# Patient Record
Sex: MALE | Race: WHITE | NOT HISPANIC OR LATINO | URBAN - METROPOLITAN AREA
[De-identification: names, ages, dates, MRNs, and addresses within clinical notes are randomized per-mention and may not be internally consistent; named-entity substitution may affect disease eponyms.]

---

## 2020-10-16 ENCOUNTER — TELEPHONE (OUTPATIENT)
Dept: PULMONOLOGY | Facility: CLINIC | Age: 75
End: 2020-10-16

## 2020-10-16 NOTE — TELEPHONE ENCOUNTER
----- Message from Flory Barber MA sent at 10/16/2020  8:05 AM CDT -----    ----- Message -----  From: Ifeoma Saldaña  Sent: 10/15/2020   2:43 PM CDT  To: [facility] Staff    Referral from [facility] 10/15/20

## 2020-10-20 ENCOUNTER — OFFICE VISIT (OUTPATIENT)
Dept: PULMONOLOGY | Facility: CLINIC | Age: 75
End: 2020-10-20
Payer: MEDICARE

## 2020-10-20 VITALS
DIASTOLIC BLOOD PRESSURE: 100 MMHG | HEART RATE: 110 BPM | WEIGHT: 148 LBS | OXYGEN SATURATION: 96 % | SYSTOLIC BLOOD PRESSURE: 150 MMHG | BODY MASS INDEX: 21.92 KG/M2 | TEMPERATURE: 98 F | HEIGHT: 69 IN

## 2020-10-20 DIAGNOSIS — J44.9 CHRONIC OBSTRUCTIVE PULMONARY DISEASE, UNSPECIFIED COPD TYPE: Primary | ICD-10-CM

## 2020-10-20 DIAGNOSIS — Z87.891 PERSONAL HISTORY OF NICOTINE DEPENDENCE: ICD-10-CM

## 2020-10-20 PROCEDURE — 99204 OFFICE O/P NEW MOD 45 MIN: CPT | Mod: 25,S$GLB,, | Performed by: NURSE PRACTITIONER

## 2020-10-20 PROCEDURE — G0008 ADMIN INFLUENZA VIRUS VAC: HCPCS | Mod: S$GLB,,, | Performed by: NURSE PRACTITIONER

## 2020-10-20 PROCEDURE — G0008 FLU VACCINE - QUADRIVALENT - HIGH DOSE (65+) PRESERVATIVE FREE IM: ICD-10-PCS | Mod: S$GLB,,, | Performed by: NURSE PRACTITIONER

## 2020-10-20 PROCEDURE — 90662 FLU VACCINE - QUADRIVALENT - HIGH DOSE (65+) PRESERVATIVE FREE IM: ICD-10-PCS | Mod: S$GLB,,, | Performed by: NURSE PRACTITIONER

## 2020-10-20 PROCEDURE — 90662 IIV NO PRSV INCREASED AG IM: CPT | Mod: S$GLB,,, | Performed by: NURSE PRACTITIONER

## 2020-10-20 PROCEDURE — 99204 PR OFFICE/OUTPT VISIT, NEW, LEVL IV, 45-59 MIN: ICD-10-PCS | Mod: 25,S$GLB,, | Performed by: NURSE PRACTITIONER

## 2020-10-20 RX ORDER — METHYLPREDNISOLONE 4 MG/1
TABLET ORAL
COMMUNITY
Start: 2020-09-05

## 2020-10-20 RX ORDER — METFORMIN HYDROCHLORIDE 500 MG/1
TABLET ORAL
COMMUNITY
Start: 2020-10-16

## 2020-10-20 RX ORDER — EZETIMIBE 10 MG/1
TABLET ORAL
COMMUNITY
Start: 2020-10-05

## 2020-10-20 RX ORDER — ASPIRIN 81 MG/1
81 TABLET ORAL
COMMUNITY

## 2020-10-20 RX ORDER — AMLODIPINE BESYLATE 5 MG/1
5 TABLET ORAL
COMMUNITY

## 2020-10-20 RX ORDER — FLUTICASONE FUROATE AND VILANTEROL TRIFENATATE 200; 25 UG/1; UG/1
POWDER RESPIRATORY (INHALATION)
COMMUNITY
Start: 2020-10-07

## 2020-10-20 RX ORDER — BUSPIRONE HYDROCHLORIDE 10 MG/1
TABLET ORAL
COMMUNITY
Start: 2020-08-07

## 2020-10-20 RX ORDER — MONTELUKAST SODIUM 10 MG/1
TABLET ORAL
COMMUNITY
Start: 2020-08-09

## 2020-10-20 RX ORDER — ESOMEPRAZOLE MAGNESIUM 40 MG/1
CAPSULE, DELAYED RELEASE ORAL
COMMUNITY
Start: 2020-08-07

## 2020-10-20 RX ORDER — LORATADINE 10 MG/1
TABLET ORAL
COMMUNITY
Start: 2020-09-25

## 2020-10-20 RX ORDER — LOSARTAN POTASSIUM 100 MG/1
TABLET ORAL
COMMUNITY
Start: 2020-10-05

## 2020-10-20 RX ORDER — LEVALBUTEROL INHALATION SOLUTION 1.25 MG/3ML
SOLUTION RESPIRATORY (INHALATION)
COMMUNITY
Start: 2020-09-25

## 2020-10-20 RX ORDER — GEMFIBROZIL 600 MG/1
TABLET, FILM COATED ORAL
COMMUNITY
Start: 2020-08-07

## 2020-10-20 RX ORDER — LORAZEPAM 0.5 MG/1
TABLET ORAL
COMMUNITY
Start: 2020-10-06

## 2020-10-20 RX ORDER — GABAPENTIN 300 MG/1
CAPSULE ORAL
COMMUNITY
Start: 2020-10-08

## 2020-10-20 RX ORDER — CLOPIDOGREL BISULFATE 75 MG/1
TABLET ORAL
COMMUNITY
Start: 2020-10-16

## 2020-10-20 NOTE — PATIENT INSTRUCTIONS
Treatment for COPD    Your healthcare provider will prescribe the best treatments for your COPD.  Treatment  Recommendations include the following:  · Medicines. Some medicines help relieve symptoms when you have them. Others are taken daily to control inflammation in the lungs. Always take your medicines as prescribed. Learn the names of your medicines, as well as how and when to use them.  · Oxygen therapy. Oxygen may be prescribed if tests show that your blood contains too little oxygen.  · Smoking. If you smoke, quit. Smoking is the main cause of COPD. Quitting will help you be able to better manage your COPD. Ask your healthcare provider about ways to help you quit smoking.  · Avoiding infections. Infections, like a cold or the flu, can cause your symptoms to worsen. Try to stay away from people who are sick. Wash your hands often. And, ask your healthcare provider about vaccines for the flu and pneumonia.  Coping with shortness of breath  Coping tips include the following:  · Exercise. Try to be as active as possible. This will improve energy levels and strengthen your muscles, so you can do more.  · Breathing techniques. Ask your healthcare provider or nurse to show you how to do pursed-lip breathing.  · Balance rest and activity. Each day, try to balance rest periods with activity. For example, you might start the day with getting dressed and eating breakfast, then relax and read the paper. After that, take a brief walk. And then sit with your feet up for a while.  · Pulmonary rehabilitation. Ask your provider, or call your local hospital to find out about pulmonary rehab programs. The programs help with managing your disease, breathing techniques, exercise, support and counseling.  · Healthy eating. Eating a healthy, balanced diet and making an effort to maintain your ideal weight are important to staying as healthy as possible. Make sure you have a lot of fruit and vegetables every day, as well as  balanced portions of whole grains, lean meats and fish, and low-fat dairy products.  Date Last Reviewed: 5/1/2016  © 0694-2655 [employer]. [address] [place] [address], [place] [address]. All rights reserved. This information is not intended as a substitute for professional medical care. Always follow your healthcare professional's instructions.
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
## 2024-02-02 ENCOUNTER — OFFICE VISIT (OUTPATIENT)
Dept: HEMATOLOGY/ONCOLOGY | Facility: CLINIC | Age: 79
End: 2024-02-02
Payer: MEDICARE

## 2024-02-02 VITALS
DIASTOLIC BLOOD PRESSURE: 71 MMHG | BODY MASS INDEX: 21.19 KG/M2 | WEIGHT: 143.5 LBS | RESPIRATION RATE: 17 BRPM | SYSTOLIC BLOOD PRESSURE: 157 MMHG | HEART RATE: 89 BPM | TEMPERATURE: 98 F

## 2024-02-02 DIAGNOSIS — E64.0 SEQUELAE OF PROTEIN-CALORIE MALNUTRITION: ICD-10-CM

## 2024-02-02 DIAGNOSIS — D64.9 NORMOCYTIC ANEMIA: Primary | ICD-10-CM

## 2024-02-02 PROCEDURE — 99204 OFFICE O/P NEW MOD 45 MIN: CPT | Mod: S$PBB,,, | Performed by: INTERNAL MEDICINE

## 2024-02-02 PROCEDURE — 99213 OFFICE O/P EST LOW 20 MIN: CPT | Performed by: INTERNAL MEDICINE
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
## 2024-03-14 ENCOUNTER — TELEPHONE (OUTPATIENT)
Dept: HEMATOLOGY/ONCOLOGY | Facility: CLINIC | Age: 79
End: 2024-03-14

## 2024-03-14 NOTE — TELEPHONE ENCOUNTER
Attempted to make contact with patient to remind them of lab work needed for an upcoming appointment 3/18/24. Patient's voicemail is not set up. Unable to leave a voicemail. Secondary contact doesn't answer the phone.

## 2024-05-01 ENCOUNTER — OFFICE VISIT (OUTPATIENT)
Dept: HEMATOLOGY/ONCOLOGY | Facility: CLINIC | Age: 79
End: 2024-05-01
Payer: MEDICARE

## 2024-05-01 VITALS
DIASTOLIC BLOOD PRESSURE: 77 MMHG | WEIGHT: 141.31 LBS | HEART RATE: 77 BPM | TEMPERATURE: 98 F | SYSTOLIC BLOOD PRESSURE: 165 MMHG | RESPIRATION RATE: 16 BRPM | BODY MASS INDEX: 20.87 KG/M2

## 2024-05-01 DIAGNOSIS — K62.5 RECTAL BLEEDING: Primary | ICD-10-CM

## 2024-05-01 PROCEDURE — 99213 OFFICE O/P EST LOW 20 MIN: CPT | Mod: S$PBB,,, | Performed by: INTERNAL MEDICINE

## 2024-05-01 PROCEDURE — 99213 OFFICE O/P EST LOW 20 MIN: CPT | Performed by: INTERNAL MEDICINE

## 2024-05-01 RX ORDER — FERROUS SULFATE 325(65) MG
325 TABLET ORAL DAILY
Qty: 30 TABLET | Refills: 3 | Status: SHIPPED | OUTPATIENT
Start: 2024-05-01 | End: 2025-05-01

## 2024-05-01 NOTE — ASSESSMENT & PLAN NOTE
I had a long discussion with Mr. Weller today and recommended he have a colonoscopy to evaluate for possible cancer.  He refuses this testing adamantly.  I discussed that he could have a life-threatening cancer and could die from this if not diagnosed.  He clearly understands this and still refuses.  Will check CBC again and put him on iron orally and will see him again in six months.  I discussed that if he changes his mind to let us know and will arrange a referral.

## 2024-05-01 NOTE — PROGRESS NOTES
PROGRESS NOTE    Subjective:       Patient ID: Georges Weller is a 78 y.o. male.    12/4/2023-Labs:  Fe:  53  TIBC:  480  WBC:  7.7  Hb:  10.8g/d  Mcv:  90  Plt:  511    5/16/2023-EGD:  Impression:            - Normal esophagus.                        - Normal cardia, gastric fundus and gastric body.                        - Non-bleeding gastric ulcers with no stigmata of                        bleeding. Biopsied.                        - Normal examined duodenum.     Last colonoscopy was many years ago. Patient refuses this test. Afraid of what he will find.     Chief Complaint:  No chief complaint on file.  Anemia Follow up, positive stool occult blood    History of Present Illness:   Georges Weller is a 78 y.o. male who presents for follow up of above.      He complains of being cold frequently and back pain which he thinks is due to the anemia.      He has had 1/3 stool tests positive.         Family and Social history reviewed and is unchanged from previous             Current Outpatient Medications:     amLODIPine (NORVASC) 5 MG tablet, Take 5 mg by mouth., Disp: , Rfl:     aspirin (ECOTRIN) 81 MG EC tablet, Take 81 mg by mouth., Disp: , Rfl:     BREO ELLIPTA 200-25 mcg/dose DsDv diskus inhaler, INHALE 1 PUFF PO INTO LUNGS QD, Disp: , Rfl:     busPIRone (BUSPAR) 10 MG tablet, TK 1 T PO TID PRA, Disp: , Rfl:     clopidogreL (PLAVIX) 75 mg tablet, , Disp: , Rfl:     esomeprazole (NEXIUM) 40 MG capsule, , Disp: , Rfl:     ezetimibe (ZETIA) 10 mg tablet, TK 1 T PO QD, Disp: , Rfl:     ferrous sulfate (FEOSOL) 325 mg (65 mg iron) Tab tablet, Take 1 tablet (325 mg total) by mouth once daily., Disp: 30 tablet, Rfl: 3    gabapentin (NEURONTIN) 300 MG capsule, TK ONE C PO TID, Disp: , Rfl:     gemfibroziL (LOPID) 600 MG tablet, TK 1 T PO BID, Disp: , Rfl:     levalbuterol (XOPENEX) 1.25 mg/3 mL nebulizer solution, VVN Q 4 TO 6 H PRN, Disp: , Rfl:      "loratadine (CLARITIN) 10 mg tablet, , Disp: , Rfl:     LORazepam (ATIVAN) 0.5 MG tablet, TK 1 T PO BID, Disp: , Rfl:     losartan (COZAAR) 100 MG tablet, TK 1 T PO QD, Disp: , Rfl:     metFORMIN (GLUCOPHAGE) 500 MG tablet, , Disp: , Rfl:     methylPREDNISolone (MEDROL DOSEPACK) 4 mg tablet, FPD, Disp: , Rfl:     montelukast (SINGULAIR) 10 mg tablet, TK 1 T PO QHS, Disp: , Rfl:         Objective:       Physical Examination:     BP (!) 165/77   Pulse 77   Temp 98 °F (36.7 °C)   Resp 16   Wt 64.1 kg (141 lb 4.8 oz)   BMI 20.87 kg/m²     Physical Exam  Constitutional:       Appearance: Normal appearance.   HENT:      Head: Normocephalic and atraumatic.   Eyes:      General: No scleral icterus.     Conjunctiva/sclera: Conjunctivae normal.   Cardiovascular:      Rate and Rhythm: Normal rate.   Pulmonary:      Effort: Pulmonary effort is normal.   Abdominal:      General: Abdomen is flat.   Neurological:      General: No focal deficit present.      Mental Status: He is alert and oriented to person, place, and time.   Psychiatric:         Mood and Affect: Mood normal.         Behavior: Behavior normal.         Thought Content: Thought content normal.         Judgment: Judgment normal.         Labs:   No results found for this or any previous visit (from the past 336 hour(s)).  CMP  No results found for: "NA", "K", "CL", "CO2", "GLU", "BUN", "CREATININE", "CALCIUM", "PROT", "ALBUMIN", "BILITOT", "ALKPHOS", "AST", "ALT", "ANIONGAP", "ESTGFRAFRICA", "EGFRNONAA"  No results found for: "CEA"  No results found for: "PSA"        Assessment/Plan:     Problem List Items Addressed This Visit       Rectal bleeding - Primary     I had a long discussion with Mr. Weller today and recommended he have a colonoscopy to evaluate for possible cancer.  He refuses this testing adamantly.  I discussed that he could have a life-threatening cancer and could die from this if not diagnosed.  He clearly understands this and still refuses.  " Will check CBC again and put him on iron orally and will see him again in six months.  I discussed that if he changes his mind to let us know and will arrange a referral.           Relevant Medications    ferrous sulfate (FEOSOL) 325 mg (65 mg iron) Tab tablet    Other Relevant Orders    CBC Auto Differential       Discussion:     Follow up in about 6 months (around 11/1/2024).      Electronically signed by Juan Hodge

## 2024-08-05 PROBLEM — K62.5 RECTAL BLEEDING: Status: RESOLVED | Noted: 2024-05-01 | Resolved: 2024-08-05

## 2024-08-29 DIAGNOSIS — K62.5 RECTAL BLEEDING: ICD-10-CM

## 2024-08-29 DIAGNOSIS — D64.9 NORMOCYTIC ANEMIA: Primary | ICD-10-CM

## 2024-08-29 RX ORDER — FERROUS SULFATE TAB 325 MG (65 MG ELEMENTAL FE) 325 (65 FE) MG
325 TAB ORAL
Qty: 30 TABLET | Refills: 3 | Status: SHIPPED | OUTPATIENT
Start: 2024-08-29

## 2025-05-09 NOTE — ASSESSMENT & PLAN NOTE
I had a long talk with Mr. Weller about this long-standing anemia.  I feel there is an element of chronic disease but he clearly needs a further work up and will arrange for this to be done.  He refuses colonoscopy but will ask him to get stool card to see if there is any microscopic blood loss.  Will have him back with me in the next few weeks to evaluate this work up and discuss further options.   
No